# Patient Record
Sex: FEMALE | Race: ASIAN | NOT HISPANIC OR LATINO
[De-identification: names, ages, dates, MRNs, and addresses within clinical notes are randomized per-mention and may not be internally consistent; named-entity substitution may affect disease eponyms.]

---

## 2017-06-19 ENCOUNTER — APPOINTMENT (OUTPATIENT)
Dept: CARDIOLOGY | Facility: CLINIC | Age: 67
End: 2017-06-19

## 2017-06-19 ENCOUNTER — NON-APPOINTMENT (OUTPATIENT)
Age: 67
End: 2017-06-19

## 2017-06-19 VITALS
HEART RATE: 53 BPM | HEIGHT: 60 IN | WEIGHT: 116 LBS | SYSTOLIC BLOOD PRESSURE: 131 MMHG | BODY MASS INDEX: 22.78 KG/M2 | DIASTOLIC BLOOD PRESSURE: 76 MMHG | OXYGEN SATURATION: 95 %

## 2017-06-19 DIAGNOSIS — R01.1 CARDIAC MURMUR, UNSPECIFIED: ICD-10-CM

## 2017-07-29 ENCOUNTER — APPOINTMENT (OUTPATIENT)
Dept: CARDIOLOGY | Facility: CLINIC | Age: 67
End: 2017-07-29
Payer: MEDICARE

## 2017-07-29 PROCEDURE — 93880 EXTRACRANIAL BILAT STUDY: CPT

## 2017-08-01 ENCOUNTER — APPOINTMENT (OUTPATIENT)
Dept: OTOLARYNGOLOGY | Facility: CLINIC | Age: 67
End: 2017-08-01
Payer: MEDICARE

## 2017-08-01 VITALS
DIASTOLIC BLOOD PRESSURE: 70 MMHG | WEIGHT: 116 LBS | SYSTOLIC BLOOD PRESSURE: 125 MMHG | BODY MASS INDEX: 22.78 KG/M2 | HEART RATE: 69 BPM | HEIGHT: 60 IN

## 2017-08-01 PROCEDURE — 92504 EAR MICROSCOPY EXAMINATION: CPT

## 2017-08-01 PROCEDURE — 92567 TYMPANOMETRY: CPT

## 2017-08-01 PROCEDURE — 92557 COMPREHENSIVE HEARING TEST: CPT

## 2017-08-01 PROCEDURE — 99214 OFFICE O/P EST MOD 30 MIN: CPT

## 2017-08-05 ENCOUNTER — APPOINTMENT (OUTPATIENT)
Dept: CARDIOLOGY | Facility: CLINIC | Age: 67
End: 2017-08-05
Payer: MEDICARE

## 2017-08-05 PROCEDURE — 93306 TTE W/DOPPLER COMPLETE: CPT

## 2017-09-01 ENCOUNTER — TRANSCRIPTION ENCOUNTER (OUTPATIENT)
Age: 67
End: 2017-09-01

## 2017-09-28 ENCOUNTER — APPOINTMENT (OUTPATIENT)
Dept: OPHTHALMOLOGY | Facility: CLINIC | Age: 67
End: 2017-09-28
Payer: MEDICARE

## 2017-09-28 DIAGNOSIS — H35.412 LATTICE DEGENERATION OF RETINA, LEFT EYE: ICD-10-CM

## 2017-09-28 PROCEDURE — 92225: CPT | Mod: LT

## 2017-09-28 PROCEDURE — 92014 COMPRE OPH EXAM EST PT 1/>: CPT

## 2017-11-14 ENCOUNTER — APPOINTMENT (OUTPATIENT)
Dept: CARDIOLOGY | Facility: CLINIC | Age: 67
End: 2017-11-14
Payer: MEDICARE

## 2017-11-14 ENCOUNTER — NON-APPOINTMENT (OUTPATIENT)
Age: 67
End: 2017-11-14

## 2017-11-14 VITALS
DIASTOLIC BLOOD PRESSURE: 83 MMHG | HEIGHT: 60 IN | SYSTOLIC BLOOD PRESSURE: 137 MMHG | BODY MASS INDEX: 23.56 KG/M2 | HEART RATE: 57 BPM | OXYGEN SATURATION: 97 % | WEIGHT: 120 LBS

## 2017-11-14 PROCEDURE — 99214 OFFICE O/P EST MOD 30 MIN: CPT

## 2017-11-14 PROCEDURE — 93000 ELECTROCARDIOGRAM COMPLETE: CPT

## 2017-11-30 ENCOUNTER — MEDICATION RENEWAL (OUTPATIENT)
Age: 67
End: 2017-11-30

## 2017-11-30 ENCOUNTER — FORM ENCOUNTER (OUTPATIENT)
Age: 67
End: 2017-11-30

## 2017-12-01 ENCOUNTER — APPOINTMENT (OUTPATIENT)
Dept: CARDIOLOGY | Facility: CLINIC | Age: 67
End: 2017-12-01

## 2017-12-01 ENCOUNTER — OUTPATIENT (OUTPATIENT)
Dept: OUTPATIENT SERVICES | Facility: HOSPITAL | Age: 67
LOS: 1 days | End: 2017-12-01
Payer: SELF-PAY

## 2017-12-01 DIAGNOSIS — R07.9 CHEST PAIN, UNSPECIFIED: ICD-10-CM

## 2017-12-01 DIAGNOSIS — E78.5 HYPERLIPIDEMIA, UNSPECIFIED: ICD-10-CM

## 2017-12-01 PROCEDURE — 75571 CT HRT W/O DYE W/CA TEST: CPT

## 2017-12-01 PROCEDURE — 75571 CT HRT W/O DYE W/CA TEST: CPT | Mod: 26

## 2017-12-11 ENCOUNTER — APPOINTMENT (OUTPATIENT)
Dept: CARDIOLOGY | Facility: CLINIC | Age: 67
End: 2017-12-11
Payer: MEDICARE

## 2017-12-11 VITALS
HEIGHT: 60 IN | DIASTOLIC BLOOD PRESSURE: 82 MMHG | HEART RATE: 57 BPM | BODY MASS INDEX: 22.78 KG/M2 | SYSTOLIC BLOOD PRESSURE: 136 MMHG | OXYGEN SATURATION: 96 % | WEIGHT: 116 LBS

## 2017-12-11 PROCEDURE — 99214 OFFICE O/P EST MOD 30 MIN: CPT

## 2018-09-28 ENCOUNTER — APPOINTMENT (OUTPATIENT)
Dept: OPHTHALMOLOGY | Facility: CLINIC | Age: 68
End: 2018-09-28
Payer: MEDICARE

## 2018-09-28 PROCEDURE — 92134 CPTRZ OPH DX IMG PST SGM RTA: CPT

## 2018-09-28 PROCEDURE — 92004 COMPRE OPH EXAM NEW PT 1/>: CPT

## 2018-10-04 ENCOUNTER — APPOINTMENT (OUTPATIENT)
Dept: SPEECH THERAPY | Facility: CLINIC | Age: 68
End: 2018-10-04

## 2018-10-04 ENCOUNTER — OUTPATIENT (OUTPATIENT)
Dept: OUTPATIENT SERVICES | Facility: HOSPITAL | Age: 68
LOS: 1 days | Discharge: HOME | End: 2018-10-04

## 2018-10-05 DIAGNOSIS — H90.3 SENSORINEURAL HEARING LOSS, BILATERAL: ICD-10-CM

## 2018-10-15 ENCOUNTER — APPOINTMENT (OUTPATIENT)
Dept: OPHTHALMOLOGY | Facility: CLINIC | Age: 68
End: 2018-10-15
Payer: MEDICARE

## 2018-10-15 DIAGNOSIS — H26.492 OTHER SECONDARY CATARACT, LEFT EYE: ICD-10-CM

## 2018-10-15 PROCEDURE — 92014 COMPRE OPH EXAM EST PT 1/>: CPT

## 2018-10-26 ENCOUNTER — RESULT REVIEW (OUTPATIENT)
Age: 68
End: 2018-10-26

## 2018-10-26 ENCOUNTER — APPOINTMENT (OUTPATIENT)
Dept: OBGYN | Facility: CLINIC | Age: 68
End: 2018-10-26
Payer: MEDICARE

## 2018-10-26 PROCEDURE — G0101: CPT

## 2018-11-16 ENCOUNTER — TRANSCRIPTION ENCOUNTER (OUTPATIENT)
Age: 68
End: 2018-11-16

## 2018-11-28 ENCOUNTER — APPOINTMENT (OUTPATIENT)
Dept: OPHTHALMOLOGY | Facility: CLINIC | Age: 68
End: 2018-11-28

## 2018-11-30 ENCOUNTER — APPOINTMENT (OUTPATIENT)
Dept: CT IMAGING | Facility: IMAGING CENTER | Age: 68
End: 2018-11-30
Payer: MEDICARE

## 2018-11-30 ENCOUNTER — OUTPATIENT (OUTPATIENT)
Dept: OUTPATIENT SERVICES | Facility: HOSPITAL | Age: 68
LOS: 1 days | End: 2018-11-30
Payer: MEDICARE

## 2018-11-30 DIAGNOSIS — Z00.8 ENCOUNTER FOR OTHER GENERAL EXAMINATION: ICD-10-CM

## 2018-11-30 PROCEDURE — 71250 CT THORAX DX C-: CPT

## 2018-11-30 PROCEDURE — 71250 CT THORAX DX C-: CPT | Mod: 26

## 2018-12-04 ENCOUNTER — APPOINTMENT (OUTPATIENT)
Dept: CARDIOLOGY | Facility: CLINIC | Age: 68
End: 2018-12-04
Payer: MEDICARE

## 2018-12-04 ENCOUNTER — NON-APPOINTMENT (OUTPATIENT)
Age: 68
End: 2018-12-04

## 2018-12-04 VITALS
OXYGEN SATURATION: 98 % | SYSTOLIC BLOOD PRESSURE: 117 MMHG | BODY MASS INDEX: 22.97 KG/M2 | WEIGHT: 117 LBS | DIASTOLIC BLOOD PRESSURE: 72 MMHG | HEART RATE: 59 BPM | HEIGHT: 60 IN

## 2018-12-04 PROCEDURE — 99214 OFFICE O/P EST MOD 30 MIN: CPT

## 2018-12-04 PROCEDURE — 93000 ELECTROCARDIOGRAM COMPLETE: CPT

## 2019-01-05 ENCOUNTER — APPOINTMENT (OUTPATIENT)
Dept: OTOLARYNGOLOGY | Facility: CLINIC | Age: 69
End: 2019-01-05

## 2019-03-27 ENCOUNTER — APPOINTMENT (OUTPATIENT)
Dept: GASTROENTEROLOGY | Facility: CLINIC | Age: 69
End: 2019-03-27

## 2019-04-29 ENCOUNTER — APPOINTMENT (OUTPATIENT)
Dept: OPHTHALMOLOGY | Facility: CLINIC | Age: 69
End: 2019-04-29
Payer: COMMERCIAL

## 2019-04-29 PROCEDURE — 92012 INTRM OPH EXAM EST PATIENT: CPT

## 2019-05-06 ENCOUNTER — TRANSCRIPTION ENCOUNTER (OUTPATIENT)
Age: 69
End: 2019-05-06

## 2019-05-09 ENCOUNTER — APPOINTMENT (OUTPATIENT)
Dept: OPHTHALMOLOGY | Facility: CLINIC | Age: 69
End: 2019-05-09
Payer: COMMERCIAL

## 2019-05-09 DIAGNOSIS — H10.012 ACUTE FOLLICULAR CONJUNCTIVITIS, LEFT EYE: ICD-10-CM

## 2019-05-09 PROCEDURE — 92012 INTRM OPH EXAM EST PATIENT: CPT

## 2019-08-16 ENCOUNTER — APPOINTMENT (OUTPATIENT)
Dept: INTERNAL MEDICINE | Facility: CLINIC | Age: 69
End: 2019-08-16
Payer: MEDICARE

## 2019-08-16 VITALS
SYSTOLIC BLOOD PRESSURE: 130 MMHG | HEART RATE: 67 BPM | WEIGHT: 114 LBS | HEIGHT: 59 IN | OXYGEN SATURATION: 98 % | BODY MASS INDEX: 22.98 KG/M2 | DIASTOLIC BLOOD PRESSURE: 70 MMHG

## 2019-08-16 DIAGNOSIS — K76.89 OTHER SPECIFIED DISEASES OF LIVER: ICD-10-CM

## 2019-08-16 PROCEDURE — G0438: CPT

## 2019-08-29 ENCOUNTER — APPOINTMENT (OUTPATIENT)
Dept: CARDIOLOGY | Facility: CLINIC | Age: 69
End: 2019-08-29
Payer: MEDICARE

## 2019-08-29 ENCOUNTER — NON-APPOINTMENT (OUTPATIENT)
Age: 69
End: 2019-08-29

## 2019-08-29 VITALS
HEART RATE: 65 BPM | DIASTOLIC BLOOD PRESSURE: 71 MMHG | OXYGEN SATURATION: 99 % | WEIGHT: 113 LBS | BODY MASS INDEX: 22.78 KG/M2 | HEIGHT: 59 IN | SYSTOLIC BLOOD PRESSURE: 127 MMHG

## 2019-08-29 PROBLEM — K76.89 HEPATIC CYST: Status: ACTIVE | Noted: 2019-08-16

## 2019-08-29 PROCEDURE — 99214 OFFICE O/P EST MOD 30 MIN: CPT

## 2019-08-29 PROCEDURE — 93000 ELECTROCARDIOGRAM COMPLETE: CPT

## 2019-08-29 NOTE — PAST MEDICAL HISTORY
[Postmenopausal] : postmenopausal [Menarche Age ____] : age at menarche was [unfilled] [de-identified] : s/p [facility] 2003

## 2019-08-29 NOTE — HISTORY OF PRESENT ILLNESS
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

## 2019-08-29 NOTE — REVIEW OF SYSTEMS
[Shortness Of Breath] : no shortness of breath [Dyspnea on exertion] : not dyspnea during exertion [Chest  Pressure] : no chest pressure [Chest Pain] : no chest pain [Lower Ext Edema] : no extremity edema [Leg Claudication] : no intermittent leg claudication [Palpitations] : no palpitations [Cough] : no cough [Wheezing] : no wheezing [Abdominal Pain] : no abdominal pain [Nausea] : no nausea [Heartburn] : no heartburn [Change in Appetite] : no change in appetite [Dysphagia] : no dysphagia [Dysuria] : no dysuria [Negative] : Endocrine

## 2019-08-29 NOTE — HISTORY OF PRESENT ILLNESS
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 She is feeling well although she has not been exercising regularly due to her extensive work finishing up her medical text book. Most recently, she was evaluated for upper respiratory infection and CAT scan of the chest revealed left lower lobe scarring, bronchiectasis, small pulmonary nodules, and scoliosis for which she is being followed. She reports no chest pain, dyspnea, palpitations, lightheadedness, or syncope.\par \par Past medical history is remarkable for mild CAD with calcium score of 5, mild carotid atherosclerosis, hypothyroidism, osteopenia, scoliosis, total abdominal hysterectomy/bilateral salpingo-oophorectomy at age 54 for dysfunctional uterine bleeding, and mild hyperlipidemia. She has no history of diabetes, rheumatic fever, peptic ulcer disease, bleeding disorders, or pulmonary disease.\par

## 2019-08-29 NOTE — PHYSICAL EXAM
[No Acute Distress] : no acute distress [Well Developed] : well developed [Well Nourished] : well nourished [Well-Appearing] : well-appearing [Normal Sclera/Conjunctiva] : normal sclera/conjunctiva [PERRL] : pupils equal round and reactive to light [EOMI] : extraocular movements intact [Normal Outer Ear/Nose] : the outer ears and nose were normal in appearance [Normal Oropharynx] : the oropharynx was normal [No JVD] : no jugular venous distention [Supple] : supple [No Lymphadenopathy] : no lymphadenopathy [No Respiratory Distress] : no respiratory distress  [Thyroid Normal, No Nodules] : the thyroid was normal and there were no nodules present [No Accessory Muscle Use] : no accessory muscle use [Normal Rate] : normal rate  [Regular Rhythm] : with a regular rhythm [Normal S1, S2] : normal S1 and S2 [No Murmur] : no murmur heard [No Carotid Bruits] : no carotid bruits [No Abdominal Bruit] : a ~M bruit was not heard ~T in the abdomen [No Varicosities] : no varicosities [Pedal Pulses Present] : the pedal pulses are present [No Edema] : there was no peripheral edema [No Palpable Aorta] : no palpable aorta [No Extremity Clubbing/Cyanosis] : no extremity clubbing/cyanosis [Soft] : abdomen soft [Non Tender] : non-tender [Non-distended] : non-distended [No Masses] : no abdominal mass palpated [No HSM] : no HSM [Normal Bowel Sounds] : normal bowel sounds [Normal Posterior Cervical Nodes] : no posterior cervical lymphadenopathy [Normal Anterior Cervical Nodes] : no anterior cervical lymphadenopathy [No CVA Tenderness] : no CVA  tenderness [No Spinal Tenderness] : no spinal tenderness [No Joint Swelling] : no joint swelling [Scoliosis] : scoliosis [Grossly Normal Strength/Tone] : grossly normal strength/tone [No Rash] : no rash [Coordination Grossly Intact] : coordination grossly intact [Normal Gait] : normal gait [No Focal Deficits] : no focal deficits [Deep Tendon Reflexes (DTR)] : deep tendon reflexes were 2+ and symmetric [Normal Affect] : the affect was normal [Normal Insight/Judgement] : insight and judgment were intact [de-identified] : basilar crackles at left base only with spinal curvature noted

## 2019-08-29 NOTE — HEALTH RISK ASSESSMENT
[Yes] : Yes [Monthly or less (1 pt)] : Monthly or less (1 point) [No falls in past year] : Patient reported no falls in the past year [Patient reported bone density results were abnormal] : Patient reported bone density results were abnormal [0] : 2) Feeling down, depressed, or hopeless: Not at all (0) [Patient reported colonoscopy was normal] : Patient reported colonoscopy was normal [] : No [de-identified] : occasioanl [[id number]] : 0 [BoneDensityDate] : 03/18 [BoneDensityComments] : osteopenia [ColonoscopyDate] : 12/18 [ColonoscopyComments] : as per pt [FreeTextEntry4] : done

## 2019-08-29 NOTE — DISCUSSION/SUMMARY
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 We had an extensive discussion concerning her mild carotid atherosclerosis noted in the past as well as her calcium score of 5. Given subclinical atherosclerosis, we will increase pravastatin to 40mg given a nice combination of LDL reduction and high HDL. She will continue aspirin 81 mg every other daily and we had an extensive discussion concerning cardiovascular risk factor reduction. We will repeat her carotid Doppler evaluation this  year. Counseling on cardiovascular risk represented greater than 50% of her visit which was 30 minutes in duration

## 2019-09-05 ENCOUNTER — APPOINTMENT (OUTPATIENT)
Dept: CARDIOLOGY | Facility: CLINIC | Age: 69
End: 2019-09-05
Payer: MEDICARE

## 2019-09-05 PROCEDURE — 93880 EXTRACRANIAL BILAT STUDY: CPT

## 2019-09-24 ENCOUNTER — APPOINTMENT (OUTPATIENT)
Dept: OTOLARYNGOLOGY | Facility: CLINIC | Age: 69
End: 2019-09-24
Payer: MEDICARE

## 2019-09-24 VITALS
BODY MASS INDEX: 22.78 KG/M2 | HEART RATE: 62 BPM | DIASTOLIC BLOOD PRESSURE: 66 MMHG | HEIGHT: 59 IN | WEIGHT: 113 LBS | SYSTOLIC BLOOD PRESSURE: 107 MMHG

## 2019-09-24 PROCEDURE — 92557 COMPREHENSIVE HEARING TEST: CPT

## 2019-09-24 PROCEDURE — 92567 TYMPANOMETRY: CPT

## 2019-09-24 PROCEDURE — 99214 OFFICE O/P EST MOD 30 MIN: CPT | Mod: 25

## 2019-09-24 RX ORDER — ASPIRIN 81 MG/1
81 TABLET ORAL
Refills: 0 | Status: DISCONTINUED | COMMUNITY
Start: 2017-12-11 | End: 2019-09-24

## 2019-09-25 NOTE — HISTORY OF PRESENT ILLNESS
[de-identified] : 69 year old female follow up for bilateral mild SNHL, Left worse than Right, not wearing hearing aids.  Patient states last hearing test 1 year ago.  Denies otalgia, otorrhea, dizziness, vertigo, tinnitus and ear infections in the pat year.

## 2019-09-25 NOTE — REASON FOR VISIT
[Subsequent Evaluation] : a subsequent evaluation for [Other: _____] : [unfilled] [FreeTextEntry2] : follow up for bilateral mild SNHL, Left worse than Right, not wearing hearing aids.

## 2019-10-31 ENCOUNTER — RESULT REVIEW (OUTPATIENT)
Age: 69
End: 2019-10-31

## 2019-11-01 ENCOUNTER — APPOINTMENT (OUTPATIENT)
Dept: OBGYN | Facility: CLINIC | Age: 69
End: 2019-11-01
Payer: MEDICARE

## 2019-11-01 PROCEDURE — G0101: CPT

## 2019-12-03 ENCOUNTER — APPOINTMENT (OUTPATIENT)
Dept: INTERNAL MEDICINE | Facility: CLINIC | Age: 69
End: 2019-12-03
Payer: MEDICARE

## 2019-12-03 VITALS
WEIGHT: 118 LBS | BODY MASS INDEX: 23.79 KG/M2 | HEART RATE: 62 BPM | OXYGEN SATURATION: 98 % | HEIGHT: 59 IN | SYSTOLIC BLOOD PRESSURE: 140 MMHG | DIASTOLIC BLOOD PRESSURE: 90 MMHG

## 2019-12-03 VITALS — DIASTOLIC BLOOD PRESSURE: 96 MMHG | SYSTOLIC BLOOD PRESSURE: 150 MMHG

## 2019-12-03 PROCEDURE — 99214 OFFICE O/P EST MOD 30 MIN: CPT

## 2019-12-03 PROCEDURE — G0442 ANNUAL ALCOHOL SCREEN 15 MIN: CPT | Mod: 59

## 2019-12-03 NOTE — PHYSICAL EXAM
[No Acute Distress] : no acute distress [No Edema] : there was no peripheral edema [Well Nourished] : well nourished [No Focal Deficits] : no focal deficits [Normal Gait] : normal gait [Normal] : affect was normal and insight and judgment were intact

## 2019-12-03 NOTE — HISTORY OF PRESENT ILLNESS
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 Today BP in office multipl time by me-150/90-94.\par Returned from 2 week cruise to [place], ate well, not limiting salt\par \par Had 2 pneumovax and tdap and colonosocpy and will forward dates

## 2019-12-03 NOTE — HEALTH RISK ASSESSMENT
[] : No [No] : No [No falls in past year] : Patient reported no falls in the past year [Audit-CScore] : 0 [0] : 2) Feeling down, depressed, or hopeless: Not at all (0) [[id number]] : 0

## 2019-12-11 ENCOUNTER — TRANSCRIPTION ENCOUNTER (OUTPATIENT)
Age: 69
End: 2019-12-11

## 2019-12-17 ENCOUNTER — APPOINTMENT (OUTPATIENT)
Dept: CARDIOLOGY | Facility: CLINIC | Age: 69
End: 2019-12-17
Payer: MEDICARE

## 2019-12-17 VITALS
OXYGEN SATURATION: 100 % | DIASTOLIC BLOOD PRESSURE: 82 MMHG | SYSTOLIC BLOOD PRESSURE: 135 MMHG | HEART RATE: 64 BPM | HEIGHT: 59 IN | WEIGHT: 119 LBS | BODY MASS INDEX: 23.99 KG/M2

## 2019-12-17 PROCEDURE — 99214 OFFICE O/P EST MOD 30 MIN: CPT

## 2020-01-01 ENCOUNTER — TRANSCRIPTION ENCOUNTER (OUTPATIENT)
Age: 70
End: 2020-01-01

## 2020-01-07 ENCOUNTER — FORM ENCOUNTER (OUTPATIENT)
Age: 70
End: 2020-01-07

## 2020-01-08 ENCOUNTER — APPOINTMENT (OUTPATIENT)
Dept: CT IMAGING | Facility: IMAGING CENTER | Age: 70
End: 2020-01-08
Payer: MEDICARE

## 2020-01-08 ENCOUNTER — OUTPATIENT (OUTPATIENT)
Dept: OUTPATIENT SERVICES | Facility: HOSPITAL | Age: 70
LOS: 1 days | End: 2020-01-08
Payer: MEDICARE

## 2020-01-08 DIAGNOSIS — Z00.8 ENCOUNTER FOR OTHER GENERAL EXAMINATION: ICD-10-CM

## 2020-01-08 PROCEDURE — 71250 CT THORAX DX C-: CPT

## 2020-01-08 PROCEDURE — 71250 CT THORAX DX C-: CPT | Mod: 26

## 2020-03-03 ENCOUNTER — APPOINTMENT (OUTPATIENT)
Dept: INTERNAL MEDICINE | Facility: CLINIC | Age: 70
End: 2020-03-03

## 2020-08-14 ENCOUNTER — NON-APPOINTMENT (OUTPATIENT)
Age: 70
End: 2020-08-14

## 2020-08-14 ENCOUNTER — APPOINTMENT (OUTPATIENT)
Dept: CARDIOLOGY | Facility: CLINIC | Age: 70
End: 2020-08-14
Payer: MEDICARE

## 2020-08-14 VITALS
HEART RATE: 49 BPM | DIASTOLIC BLOOD PRESSURE: 81 MMHG | HEIGHT: 59 IN | WEIGHT: 111 LBS | SYSTOLIC BLOOD PRESSURE: 136 MMHG | BODY MASS INDEX: 22.38 KG/M2 | OXYGEN SATURATION: 98 %

## 2020-08-14 DIAGNOSIS — I65.29 OCCLUSION AND STENOSIS OF UNSPECIFIED CAROTID ARTERY: ICD-10-CM

## 2020-08-14 PROCEDURE — 93000 ELECTROCARDIOGRAM COMPLETE: CPT

## 2020-08-14 PROCEDURE — 99214 OFFICE O/P EST MOD 30 MIN: CPT

## 2020-08-15 ENCOUNTER — RESULT CHARGE (OUTPATIENT)
Age: 70
End: 2020-08-15

## 2020-08-16 PROBLEM — I65.29 CAROTID ATHEROSCLEROSIS: Status: ACTIVE | Noted: 2017-06-19

## 2020-08-18 ENCOUNTER — APPOINTMENT (OUTPATIENT)
Dept: INTERNAL MEDICINE | Facility: CLINIC | Age: 70
End: 2020-08-18

## 2020-08-24 ENCOUNTER — APPOINTMENT (OUTPATIENT)
Dept: INTERNAL MEDICINE | Facility: CLINIC | Age: 70
End: 2020-08-24

## 2020-10-29 ENCOUNTER — APPOINTMENT (OUTPATIENT)
Dept: INTERNAL MEDICINE | Facility: CLINIC | Age: 70
End: 2020-10-29

## 2020-11-06 ENCOUNTER — APPOINTMENT (OUTPATIENT)
Dept: INTERNAL MEDICINE | Facility: CLINIC | Age: 70
End: 2020-11-06
Payer: MEDICARE

## 2020-11-06 PROCEDURE — 99442: CPT | Mod: 95

## 2020-11-06 NOTE — HISTORY OF PRESENT ILLNESS
[Home] : at home, [unfilled] , at the time of the visit. [Other Location: e.g. Home (Enter Location, City,State)___] : at [unfilled] [Verbal consent obtained from patient] : the patient, [unfilled] [de-identified] : Wants to review all her labs and imaging reports that she prepared as she did not want to come in of her wellness visit . Her  in 81 and concerned she needs to stay safe and not risk getting COVID infection.\par -dexa 8/12/2020 osteopenia\par -labs [phone]\par -mammogram/US-10/23/2020\par -BP [id number]'s\par -Immunization records

## 2021-05-03 ENCOUNTER — APPOINTMENT (OUTPATIENT)
Dept: INTERNAL MEDICINE | Facility: CLINIC | Age: 71
End: 2021-05-03
Payer: MEDICARE

## 2021-05-03 VITALS
WEIGHT: 110 LBS | BODY MASS INDEX: 22.18 KG/M2 | DIASTOLIC BLOOD PRESSURE: 70 MMHG | HEIGHT: 59 IN | HEART RATE: 68 BPM | OXYGEN SATURATION: 97 % | SYSTOLIC BLOOD PRESSURE: 120 MMHG

## 2021-05-03 DIAGNOSIS — Z78.9 OTHER SPECIFIED HEALTH STATUS: ICD-10-CM

## 2021-05-03 PROCEDURE — G0439: CPT

## 2021-05-03 PROCEDURE — G0442 ANNUAL ALCOHOL SCREEN 15 MIN: CPT | Mod: 59

## 2021-05-03 PROCEDURE — G0444 DEPRESSION SCREEN ANNUAL: CPT | Mod: 59

## 2021-05-03 NOTE — HISTORY OF PRESENT ILLNESS
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..\par Has 2 children in 40's , one with thyroid condition.\par PMH:\par -Scoliosis/Bronchiectasisi with chronic lung changes and rales at left base-followed by pulm\par -Hypothyroid-on levothyroxine -normal TSH April 2019-[facility] (endo) but will stop going and get labs with me\par -HLD-on diet/statin-cardio f/u Dr Torres-calcium score 5, mild carotis plaque, on aspirin\par -osteopenia-on calcium/Vit D -DEXA 3/23/2018, 8/12/2020\par -Hx hepatic cyst-last f/u with [facility] liver 8/2019-wants rx to repeat study\par -Last colonoscopy Dec 2018-reportedly normal\par -Had Pfizer covid vx\par -Brought update of her vaccines and datesreceived, needs Tdap but will get at pharmacy where it is covered

## 2021-05-03 NOTE — HEALTH RISK ASSESSMENT
[] : No [Yes] : Yes [Monthly or less (1 pt)] : Monthly or less (1 point) [No] : In the past 12 months have you used drugs other than those required for medical reasons? No [No falls in past year] : Patient reported no falls in the past year [0] : 2) Feeling down, depressed, or hopeless: Not at all (0) [de-identified] : occasioanl [Audit-CScore] : 1 [[id number]] : 0 [Patient reported bone density results were abnormal] : Patient reported bone density results were abnormal [Patient reported colonoscopy was normal] : Patient reported colonoscopy was normal [BoneDensityDate] : 03/18 [BoneDensityComments] : osteopenia [ColonoscopyDate] : 12/18 [ColonoscopyComments] : as per pt [FreeTextEntry4] : done

## 2021-05-03 NOTE — PHYSICAL EXAM
[Well Nourished] : well nourished [Well Developed] : well developed [Well-Appearing] : well-appearing [Normal Sclera/Conjunctiva] : normal sclera/conjunctiva [PERRL] : pupils equal round and reactive to light [EOMI] : extraocular movements intact [Normal Outer Ear/Nose] : the outer ears and nose were normal in appearance [Normal Oropharynx] : the oropharynx was normal [No JVD] : no jugular venous distention [No Lymphadenopathy] : no lymphadenopathy [Supple] : supple [Thyroid Normal, No Nodules] : the thyroid was normal and there were no nodules present [No Respiratory Distress] : no respiratory distress  [No Accessory Muscle Use] : no accessory muscle use [Normal Rate] : normal rate  [Regular Rhythm] : with a regular rhythm [Normal S1, S2] : normal S1 and S2 [No Murmur] : no murmur heard [No Carotid Bruits] : no carotid bruits [No Abdominal Bruit] : a ~M bruit was not heard ~T in the abdomen [No Varicosities] : no varicosities [Pedal Pulses Present] : the pedal pulses are present [No Edema] : there was no peripheral edema [No Palpable Aorta] : no palpable aorta [No Extremity Clubbing/Cyanosis] : no extremity clubbing/cyanosis [Normal Appearance] : normal in appearance [No Nipple Discharge] : no nipple discharge [No Axillary Lymphadenopathy] : no axillary lymphadenopathy [Soft] : abdomen soft [Non Tender] : non-tender [Non-distended] : non-distended [No Masses] : no abdominal mass palpated [No HSM] : no HSM [Normal Bowel Sounds] : normal bowel sounds [Normal Posterior Cervical Nodes] : no posterior cervical lymphadenopathy [Normal Anterior Cervical Nodes] : no anterior cervical lymphadenopathy [No CVA Tenderness] : no CVA  tenderness [No Spinal Tenderness] : no spinal tenderness [Scoliosis] : scoliosis [No Joint Swelling] : no joint swelling [Grossly Normal Strength/Tone] : grossly normal strength/tone [No Rash] : no rash [Coordination Grossly Intact] : coordination grossly intact [No Focal Deficits] : no focal deficits [Normal Gait] : normal gait [Deep Tendon Reflexes (DTR)] : deep tendon reflexes were 2+ and symmetric [Normal Affect] : the affect was normal [Normal Insight/Judgement] : insight and judgment were intact [de-identified] : basilar crackles at left base only with spinal curvature noted

## 2021-05-03 NOTE — PAST MEDICAL HISTORY
[Postmenopausal] : postmenopausal [Menarche Age ____] : age at menarche was [unfilled] [de-identified] : s/p [facility] 2003

## 2021-05-05 LAB
25(OH)D3 SERPL-MCNC: 49.4 NG/ML
ALBUMIN SERPL ELPH-MCNC: 4.1 G/DL
ALP BLD-CCNC: 57 U/L
ALT SERPL-CCNC: 26 U/L
ANION GAP SERPL CALC-SCNC: 9 MMOL/L
APPEARANCE: CLEAR
AST SERPL-CCNC: 27 U/L
BACTERIA: NEGATIVE
BASOPHILS # BLD AUTO: 0.04 K/UL
BASOPHILS NFR BLD AUTO: 1.1 %
BILIRUB SERPL-MCNC: 0.3 MG/DL
BILIRUBIN URINE: NEGATIVE
BLOOD URINE: NEGATIVE
BUN SERPL-MCNC: 14 MG/DL
CALCIUM SERPL-MCNC: 9.4 MG/DL
CHLORIDE SERPL-SCNC: 106 MMOL/L
CHOLEST SERPL-MCNC: 176 MG/DL
CO2 SERPL-SCNC: 27 MMOL/L
COLOR: NORMAL
COVID-19 SPIKE DOMAIN ANTIBODY INTERPRETATION: POSITIVE
CREAT SERPL-MCNC: 0.72 MG/DL
EOSINOPHIL # BLD AUTO: 0.06 K/UL
EOSINOPHIL NFR BLD AUTO: 1.6 %
ESTIMATED AVERAGE GLUCOSE: 120 MG/DL
GLUCOSE QUALITATIVE U: NEGATIVE
GLUCOSE SERPL-MCNC: 94 MG/DL
HBA1C MFR BLD HPLC: 5.8 %
HCT VFR BLD CALC: 40.3 %
HDLC SERPL-MCNC: 80 MG/DL
HGB BLD-MCNC: 12.5 G/DL
HYALINE CASTS: 0 /LPF
IMM GRANULOCYTES NFR BLD AUTO: 0 %
KETONES URINE: NEGATIVE
LDLC SERPL CALC-MCNC: 84 MG/DL
LEUKOCYTE ESTERASE URINE: NEGATIVE
LYMPHOCYTES # BLD AUTO: 1.83 K/UL
LYMPHOCYTES NFR BLD AUTO: 48.7 %
MAN DIFF?: NORMAL
MCHC RBC-ENTMCNC: 26.2 PG
MCHC RBC-ENTMCNC: 31 GM/DL
MCV RBC AUTO: 84.5 FL
MICROSCOPIC-UA: NORMAL
MONOCYTES # BLD AUTO: 0.37 K/UL
MONOCYTES NFR BLD AUTO: 9.8 %
NEUTROPHILS # BLD AUTO: 1.46 K/UL
NEUTROPHILS NFR BLD AUTO: 38.8 %
NITRITE URINE: NEGATIVE
NONHDLC SERPL-MCNC: 96 MG/DL
PH URINE: 6.5
PLATELET # BLD AUTO: 206 K/UL
POTASSIUM SERPL-SCNC: 4.3 MMOL/L
PROT SERPL-MCNC: 6.6 G/DL
PROTEIN URINE: NEGATIVE
RBC # BLD: 4.77 M/UL
RBC # FLD: 14.1 %
RED BLOOD CELLS URINE: 2 /HPF
SARS-COV-2 AB SERPL IA-ACNC: 178 U/ML
SODIUM SERPL-SCNC: 142 MMOL/L
SPECIFIC GRAVITY URINE: 1.02
SQUAMOUS EPITHELIAL CELLS: 0 /HPF
T4 FREE SERPL-MCNC: 1.6 NG/DL
TRIGL SERPL-MCNC: 62 MG/DL
TSH SERPL-ACNC: 1.42 UIU/ML
UROBILINOGEN URINE: NORMAL
VIT B12 SERPL-MCNC: 842 PG/ML
WBC # FLD AUTO: 3.76 K/UL
WHITE BLOOD CELLS URINE: 1 /HPF

## 2021-05-07 ENCOUNTER — TRANSCRIPTION ENCOUNTER (OUTPATIENT)
Age: 71
End: 2021-05-07

## 2021-05-19 ENCOUNTER — NON-APPOINTMENT (OUTPATIENT)
Age: 71
End: 2021-05-19

## 2021-09-16 ENCOUNTER — APPOINTMENT (OUTPATIENT)
Dept: OTOLARYNGOLOGY | Facility: CLINIC | Age: 71
End: 2021-09-16
Payer: MEDICARE

## 2021-09-16 VITALS
BODY MASS INDEX: 21.6 KG/M2 | SYSTOLIC BLOOD PRESSURE: 108 MMHG | HEART RATE: 67 BPM | DIASTOLIC BLOOD PRESSURE: 69 MMHG | WEIGHT: 110 LBS | HEIGHT: 60 IN

## 2021-09-16 DIAGNOSIS — H90.3 SENSORINEURAL HEARING LOSS, BILATERAL: ICD-10-CM

## 2021-09-16 DIAGNOSIS — H91.13 PRESBYCUSIS, BILATERAL: ICD-10-CM

## 2021-09-16 PROCEDURE — 92557 COMPREHENSIVE HEARING TEST: CPT

## 2021-09-16 PROCEDURE — 92504 EAR MICROSCOPY EXAMINATION: CPT

## 2021-09-16 PROCEDURE — 92567 TYMPANOMETRY: CPT

## 2021-09-16 PROCEDURE — 99213 OFFICE O/P EST LOW 20 MIN: CPT | Mod: 25

## 2021-09-17 PROBLEM — H91.13 PRESBYCUSIS OF BOTH EARS: Status: ACTIVE | Noted: 2019-09-25

## 2021-09-17 NOTE — REASON FOR VISIT
[Subsequent Evaluation] : a subsequent evaluation for [FreeTextEntry2] : follow up for bilateral mild SNHL, Left worse than Right, not wearing hearing aids.

## 2021-09-17 NOTE — HISTORY OF PRESENT ILLNESS
[de-identified] : 71 year female follow up for bilateral mild SNHL, Left worse than Right, not wearing hearing aids. \par Hearing has gotten worse since last visit. L ear has more sensitivity to sound than right. Patient states she is ready for hearing aids.\par Patient denies otalgia, otorrhea, ear infections, tinnitus, dizziness, vertigo, headaches related to hearing. \par

## 2021-09-17 NOTE — DATA REVIEWED
[de-identified] : A pure tone audiogram with speech discrimination and tympanometry was ordered and performed due to patient's known lucy hearing loss and no recent audiogram performed to measure any changes  at our facility or elsewhere\par I have independently reviewed the patient's audiogram from today and my findings include lucy SNHL, decreased SDS (in 80s)\par \par

## 2021-10-04 ENCOUNTER — APPOINTMENT (OUTPATIENT)
Dept: CARDIOLOGY | Facility: CLINIC | Age: 71
End: 2021-10-04
Payer: MEDICARE

## 2021-10-04 ENCOUNTER — NON-APPOINTMENT (OUTPATIENT)
Age: 71
End: 2021-10-04

## 2021-10-04 ENCOUNTER — RESULT CHARGE (OUTPATIENT)
Age: 71
End: 2021-10-04

## 2021-10-04 VITALS
SYSTOLIC BLOOD PRESSURE: 120 MMHG | DIASTOLIC BLOOD PRESSURE: 74 MMHG | BODY MASS INDEX: 19.63 KG/M2 | WEIGHT: 115 LBS | HEIGHT: 64 IN | HEART RATE: 84 BPM | OXYGEN SATURATION: 98 %

## 2021-10-04 PROCEDURE — 93000 ELECTROCARDIOGRAM COMPLETE: CPT

## 2021-10-04 PROCEDURE — 99214 OFFICE O/P EST MOD 30 MIN: CPT

## 2022-04-05 ENCOUNTER — APPOINTMENT (OUTPATIENT)
Dept: CARDIOLOGY | Facility: CLINIC | Age: 72
End: 2022-04-05

## 2022-05-06 ENCOUNTER — APPOINTMENT (OUTPATIENT)
Dept: INTERNAL MEDICINE | Facility: CLINIC | Age: 72
End: 2022-05-06
Payer: MEDICARE

## 2022-05-06 VITALS
HEIGHT: 64 IN | HEART RATE: 64 BPM | WEIGHT: 119 LBS | DIASTOLIC BLOOD PRESSURE: 80 MMHG | BODY MASS INDEX: 20.32 KG/M2 | SYSTOLIC BLOOD PRESSURE: 120 MMHG | OXYGEN SATURATION: 98 %

## 2022-05-06 DIAGNOSIS — N60.19 DIFFUSE CYSTIC MASTOPATHY OF UNSPECIFIED BREAST: ICD-10-CM

## 2022-05-06 DIAGNOSIS — R92.2 INCONCLUSIVE MAMMOGRAM: ICD-10-CM

## 2022-05-06 DIAGNOSIS — K76.9 LIVER DISEASE, UNSPECIFIED: ICD-10-CM

## 2022-05-06 PROCEDURE — G0442 ANNUAL ALCOHOL SCREEN 15 MIN: CPT | Mod: 59

## 2022-05-06 PROCEDURE — G0444 DEPRESSION SCREEN ANNUAL: CPT | Mod: 59

## 2022-05-06 PROCEDURE — G0439: CPT

## 2022-05-09 ENCOUNTER — RX RENEWAL (OUTPATIENT)
Age: 72
End: 2022-05-09

## 2022-05-11 ENCOUNTER — APPOINTMENT (OUTPATIENT)
Dept: CARDIOLOGY | Facility: CLINIC | Age: 72
End: 2022-05-11
Payer: MEDICARE

## 2022-05-11 ENCOUNTER — NON-APPOINTMENT (OUTPATIENT)
Age: 72
End: 2022-05-11

## 2022-05-11 VITALS
WEIGHT: 120 LBS | SYSTOLIC BLOOD PRESSURE: 138 MMHG | HEIGHT: 59 IN | OXYGEN SATURATION: 97 % | DIASTOLIC BLOOD PRESSURE: 79 MMHG | BODY MASS INDEX: 24.19 KG/M2 | HEART RATE: 65 BPM

## 2022-05-11 DIAGNOSIS — I25.10 ATHEROSCLEROTIC HEART DISEASE OF NATIVE CORONARY ARTERY W/OUT ANGINA PECTORIS: ICD-10-CM

## 2022-05-11 PROCEDURE — 93000 ELECTROCARDIOGRAM COMPLETE: CPT

## 2022-05-11 PROCEDURE — 99214 OFFICE O/P EST MOD 30 MIN: CPT

## 2022-05-11 RX ORDER — PRAVASTATIN SODIUM 40 MG/1
40 TABLET ORAL
Qty: 90 | Refills: 3 | Status: DISCONTINUED | COMMUNITY
Start: 2017-12-11 | End: 2022-05-11

## 2022-05-12 ENCOUNTER — NON-APPOINTMENT (OUTPATIENT)
Age: 72
End: 2022-05-12

## 2022-05-12 PROBLEM — I25.10 CAD (CORONARY ARTERY DISEASE): Status: ACTIVE | Noted: 2017-12-11

## 2022-05-20 ENCOUNTER — APPOINTMENT (OUTPATIENT)
Dept: CARDIOLOGY | Facility: CLINIC | Age: 72
End: 2022-05-20
Payer: MEDICARE

## 2022-05-20 PROCEDURE — 93880 EXTRACRANIAL BILAT STUDY: CPT

## 2022-05-20 PROCEDURE — 93306 TTE W/DOPPLER COMPLETE: CPT

## 2022-05-25 ENCOUNTER — APPOINTMENT (OUTPATIENT)
Dept: OPHTHALMOLOGY | Facility: CLINIC | Age: 72
End: 2022-05-25
Payer: MEDICARE

## 2022-05-25 ENCOUNTER — NON-APPOINTMENT (OUTPATIENT)
Age: 72
End: 2022-05-25

## 2022-05-25 PROCEDURE — 92004 COMPRE OPH EXAM NEW PT 1/>: CPT

## 2022-05-31 LAB
25(OH)D3 SERPL-MCNC: 49 NG/ML
ALBUMIN SERPL ELPH-MCNC: 4.5 G/DL
ALP BLD-CCNC: 67 U/L
ALT SERPL-CCNC: 44 U/L
ANION GAP SERPL CALC-SCNC: 11 MMOL/L
APPEARANCE: CLEAR
AST SERPL-CCNC: 34 U/L
BACTERIA: NEGATIVE
BASOPHILS # BLD AUTO: 0.05 K/UL
BASOPHILS NFR BLD AUTO: 1.1 %
BILIRUB SERPL-MCNC: 0.5 MG/DL
BILIRUBIN URINE: NEGATIVE
BLOOD URINE: NEGATIVE
BUN SERPL-MCNC: 9 MG/DL
CALCIUM SERPL-MCNC: 10.2 MG/DL
CHLORIDE SERPL-SCNC: 103 MMOL/L
CHOLEST SERPL-MCNC: 212 MG/DL
CO2 SERPL-SCNC: 27 MMOL/L
COLOR: COLORLESS
CREAT SERPL-MCNC: 0.77 MG/DL
EGFR: 83 ML/MIN/1.73M2
EOSINOPHIL # BLD AUTO: 0.1 K/UL
EOSINOPHIL NFR BLD AUTO: 2.2 %
ESTIMATED AVERAGE GLUCOSE: 123 MG/DL
GLUCOSE QUALITATIVE U: NEGATIVE
GLUCOSE SERPL-MCNC: 89 MG/DL
HBA1C MFR BLD HPLC: 5.9 %
HCT VFR BLD CALC: 43.5 %
HDLC SERPL-MCNC: 92 MG/DL
HGB BLD-MCNC: 13.1 G/DL
HYALINE CASTS: 0 /LPF
IMM GRANULOCYTES NFR BLD AUTO: 0.2 %
KETONES URINE: NEGATIVE
LDLC SERPL CALC-MCNC: 108 MG/DL
LEUKOCYTE ESTERASE URINE: NEGATIVE
LYMPHOCYTES # BLD AUTO: 2.39 K/UL
LYMPHOCYTES NFR BLD AUTO: 51.8 %
MAN DIFF?: NORMAL
MCHC RBC-ENTMCNC: 25.4 PG
MCHC RBC-ENTMCNC: 30.1 GM/DL
MCV RBC AUTO: 84.3 FL
MICROSCOPIC-UA: NORMAL
MONOCYTES # BLD AUTO: 0.47 K/UL
MONOCYTES NFR BLD AUTO: 10.2 %
NEUTROPHILS # BLD AUTO: 1.59 K/UL
NEUTROPHILS NFR BLD AUTO: 34.5 %
NITRITE URINE: NEGATIVE
NONHDLC SERPL-MCNC: 120 MG/DL
PH URINE: 6.5
PLATELET # BLD AUTO: 243 K/UL
POTASSIUM SERPL-SCNC: 4.2 MMOL/L
PROT SERPL-MCNC: 6.9 G/DL
PROTEIN URINE: NEGATIVE
RBC # BLD: 5.16 M/UL
RBC # FLD: 15 %
RED BLOOD CELLS URINE: 0 /HPF
SODIUM SERPL-SCNC: 141 MMOL/L
SPECIFIC GRAVITY URINE: 1.01
SQUAMOUS EPITHELIAL CELLS: 0 /HPF
T4 FREE SERPL-MCNC: 1.5 NG/DL
TRIGL SERPL-MCNC: 57 MG/DL
TSH SERPL-ACNC: 3.25 UIU/ML
UROBILINOGEN URINE: NORMAL
WBC # FLD AUTO: 4.61 K/UL
WHITE BLOOD CELLS URINE: 0 /HPF

## 2022-06-28 NOTE — HEALTH RISK ASSESSMENT
[Yes] : Yes [Monthly or less (1 pt)] : Monthly or less (1 point) [No] : In the past 12 months have you used drugs other than those required for medical reasons? No [No falls in past year] : Patient reported no falls in the past year [0] : 2) Feeling down, depressed, or hopeless: Not at all (0) [Patient reported bone density results were abnormal] : Patient reported bone density results were abnormal [Patient reported colonoscopy was normal] : Patient reported colonoscopy was normal [Never] : Never [PHQ-2 Negative - No further assessment needed] : PHQ-2 Negative - No further assessment needed [de-identified] : occasioanl [Audit-CScore] : 1 [PNR2Ircei] : 0 [BoneDensityDate] : 03/18 [BoneDensityComments] : osteopenia [ColonoscopyDate] : 12/18 [ColonoscopyComments] : as per pt

## 2022-06-28 NOTE — PHYSICAL EXAM
[Well Nourished] : well nourished [Well Developed] : well developed [Well-Appearing] : well-appearing [Normal Sclera/Conjunctiva] : normal sclera/conjunctiva [PERRL] : pupils equal round and reactive to light [EOMI] : extraocular movements intact [Normal Outer Ear/Nose] : the outer ears and nose were normal in appearance [Normal Oropharynx] : the oropharynx was normal [No JVD] : no jugular venous distention [No Lymphadenopathy] : no lymphadenopathy [Supple] : supple [Thyroid Normal, No Nodules] : the thyroid was normal and there were no nodules present [No Respiratory Distress] : no respiratory distress  [No Accessory Muscle Use] : no accessory muscle use [Normal Rate] : normal rate  [Regular Rhythm] : with a regular rhythm [Normal S1, S2] : normal S1 and S2 [No Murmur] : no murmur heard [No Carotid Bruits] : no carotid bruits [No Abdominal Bruit] : a ~M bruit was not heard ~T in the abdomen [No Varicosities] : no varicosities [Pedal Pulses Present] : the pedal pulses are present [No Edema] : there was no peripheral edema [No Palpable Aorta] : no palpable aorta [No Extremity Clubbing/Cyanosis] : no extremity clubbing/cyanosis [Normal Appearance] : normal in appearance [No Nipple Discharge] : no nipple discharge [No Axillary Lymphadenopathy] : no axillary lymphadenopathy [Soft] : abdomen soft [Non Tender] : non-tender [Non-distended] : non-distended [No Masses] : no abdominal mass palpated [No HSM] : no HSM [Normal Bowel Sounds] : normal bowel sounds [Normal Posterior Cervical Nodes] : no posterior cervical lymphadenopathy [Normal Anterior Cervical Nodes] : no anterior cervical lymphadenopathy [No CVA Tenderness] : no CVA  tenderness [No Spinal Tenderness] : no spinal tenderness [Scoliosis] : scoliosis [No Joint Swelling] : no joint swelling [Grossly Normal Strength/Tone] : grossly normal strength/tone [No Rash] : no rash [Coordination Grossly Intact] : coordination grossly intact [No Focal Deficits] : no focal deficits [Normal Gait] : normal gait [Deep Tendon Reflexes (DTR)] : deep tendon reflexes were 2+ and symmetric [Normal Affect] : the affect was normal [Normal Insight/Judgement] : insight and judgment were intact [de-identified] : basilar crackles at left base only with spinal curvature noted

## 2022-06-28 NOTE — PHYSICAL EXAM
[Well Nourished] : well nourished [Well Developed] : well developed [Well-Appearing] : well-appearing [Normal Sclera/Conjunctiva] : normal sclera/conjunctiva [PERRL] : pupils equal round and reactive to light [EOMI] : extraocular movements intact [Normal Outer Ear/Nose] : the outer ears and nose were normal in appearance [Normal Oropharynx] : the oropharynx was normal [No JVD] : no jugular venous distention [No Lymphadenopathy] : no lymphadenopathy [Supple] : supple [Thyroid Normal, No Nodules] : the thyroid was normal and there were no nodules present [No Respiratory Distress] : no respiratory distress  [No Accessory Muscle Use] : no accessory muscle use [Normal Rate] : normal rate  [Regular Rhythm] : with a regular rhythm [Normal S1, S2] : normal S1 and S2 [No Murmur] : no murmur heard [No Carotid Bruits] : no carotid bruits [No Abdominal Bruit] : a ~M bruit was not heard ~T in the abdomen [No Varicosities] : no varicosities [Pedal Pulses Present] : the pedal pulses are present [No Edema] : there was no peripheral edema [No Palpable Aorta] : no palpable aorta [No Extremity Clubbing/Cyanosis] : no extremity clubbing/cyanosis [Normal Appearance] : normal in appearance [No Nipple Discharge] : no nipple discharge [No Axillary Lymphadenopathy] : no axillary lymphadenopathy [Soft] : abdomen soft [Non Tender] : non-tender [Non-distended] : non-distended [No Masses] : no abdominal mass palpated [No HSM] : no HSM [Normal Bowel Sounds] : normal bowel sounds [Normal Posterior Cervical Nodes] : no posterior cervical lymphadenopathy [Normal Anterior Cervical Nodes] : no anterior cervical lymphadenopathy [No CVA Tenderness] : no CVA  tenderness [No Spinal Tenderness] : no spinal tenderness [Scoliosis] : scoliosis [No Joint Swelling] : no joint swelling [Grossly Normal Strength/Tone] : grossly normal strength/tone [No Rash] : no rash [Coordination Grossly Intact] : coordination grossly intact [No Focal Deficits] : no focal deficits [Normal Gait] : normal gait [Deep Tendon Reflexes (DTR)] : deep tendon reflexes were 2+ and symmetric [Normal Affect] : the affect was normal [Normal Insight/Judgement] : insight and judgment were intact [de-identified] : basilar crackles at left base only with spinal curvature noted

## 2022-06-28 NOTE — HEALTH RISK ASSESSMENT
[Yes] : Yes [Monthly or less (1 pt)] : Monthly or less (1 point) [No] : In the past 12 months have you used drugs other than those required for medical reasons? No [No falls in past year] : Patient reported no falls in the past year [0] : 2) Feeling down, depressed, or hopeless: Not at all (0) [Patient reported bone density results were abnormal] : Patient reported bone density results were abnormal [Patient reported colonoscopy was normal] : Patient reported colonoscopy was normal [Never] : Never [PHQ-2 Negative - No further assessment needed] : PHQ-2 Negative - No further assessment needed [de-identified] : occasioanl [Audit-CScore] : 1 [[id number]] : 0 [BoneDensityDate] : 03/18 [BoneDensityComments] : osteopenia [ColonoscopyDate] : 12/18 [ColonoscopyComments] : as per pt

## 2022-06-28 NOTE — PAST MEDICAL HISTORY
[Postmenopausal] : postmenopausal [Menarche Age ____] : age at menarche was [unfilled] [de-identified] : s/p [facility] 2003

## 2022-06-28 NOTE — PAST MEDICAL HISTORY
[Postmenopausal] : postmenopausal [Menarche Age ____] : age at menarche was [unfilled] [de-identified] : s/p [facility] 2003

## 2022-06-28 NOTE — HISTORY OF PRESENT ILLNESS
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..\par Has 2 children in 40's , one with thyroid condition.\par PMH:\par -Scoliosis/Bronchiectasis with chronic lung changes and rales at left base-followed by pulm\par -Hypothyroid-on levothyroxine -normal TSH April 2019-[facility] ([facility]) but will stop going and get labs with me\par -HLD-on diet/statin-cardio f/u Dr Torres-calcium score 5, mild carotis plaque, on aspirin\par -osteopenia-on calcium/Vit D -DEXA 3/23/2018, 8/12/2020\par -Hx hepatic cyst-last f/u with [facility] liver 8/2019-wants rx to repeat study\par -Last colonoscopy Dec 2018-reportedly normal\par -Had Pfizer covid vx\par -Brought update of her vaccines and datesreceived, needs Tdap but will get at pharmacy where it is covered

## 2022-06-28 NOTE — HISTORY OF PRESENT ILLNESS
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..\par Has 2 children in 40's , one with thyroid condition.\par PMH:\par -Scoliosis/Bronchiectasis with chronic lung changes and rales at left base-followed by pulm\par -Hypothyroid-on levothyroxine -normal TSH April 2019-[facility] (endo) but will stop going and get labs with me\par -HLD-on diet/statin-cardio f/u Dr Torres-calcium score 5, mild carotis plaque, on aspirin\par -osteopenia-on calcium/Vit D -DEXA 3/23/2018, 8/12/2020\par -Hx hepatic cyst-last f/u with [facility] liver 8/2019-wants rx to repeat study\par -Last colonoscopy Dec 2018-reportedly normal\par -Had Pfizer covid vx\par -Brought update of her vaccines and datesreceived, needs Tdap but will get at pharmacy where it is covered

## 2022-07-14 ENCOUNTER — APPOINTMENT (OUTPATIENT)
Dept: PULMONOLOGY | Facility: CLINIC | Age: 72
End: 2022-07-14

## 2022-07-14 VITALS
HEIGHT: 59 IN | RESPIRATION RATE: 15 BRPM | HEART RATE: 68 BPM | SYSTOLIC BLOOD PRESSURE: 144 MMHG | BODY MASS INDEX: 23.79 KG/M2 | WEIGHT: 118 LBS | OXYGEN SATURATION: 96 % | TEMPERATURE: 97 F | DIASTOLIC BLOOD PRESSURE: 81 MMHG

## 2022-07-14 DIAGNOSIS — Z01.811 ENCOUNTER FOR PREPROCEDURAL RESPIRATORY EXAMINATION: ICD-10-CM

## 2022-07-14 DIAGNOSIS — Z86.16 PERSONAL HISTORY OF COVID-19: ICD-10-CM

## 2022-07-14 PROCEDURE — 99203 OFFICE O/P NEW LOW 30 MIN: CPT

## 2022-07-14 NOTE — ASSESSMENT
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  Concerned that scoliosis may have affected respiratory function and concerned about diagnosis of bronchiectasis.  \par \par On PE VSS oxygen saturation 96-97% She has kyphosis and scoliosis most pronounced in lumbar area.  Lung exam notable for rales in LLL.  Heart sounds are RRR.  THere is no edema.\par \par Chest CT 1/8/2020 personally reviewed and she does not have bronchiectasis.  There is some scarring in the lingula.  small nodules are noted- 2mm that are too small for me to see on computer that are not significant.  These have been stable.\par \par Impression : No evidence of bronchiectasis and no clinical symptoms to support this diagnosis.  She is not dyspneic.  Scoliosis can affect respiratory function.  There is no evidence of respiratory failure.\par \par Plan:\par 1- Complete PFT to be performed next week.  \par Will advise further after above.

## 2022-07-14 NOTE — HISTORY OF PRESENT ILLNESS
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  with history of scoliosis, bronchiectasis, hypothyroid,hyperlipidemia referred for evalatuion by .  \par \par Personally reviewed CT from 2020 which shows minimal if any bronchiectasis in the LLL, no suspicious nodules.  Scoliosis is noted.  \par \par Has received pneumococcal vaccination in the past recently, not diagnosed.\par Fully covid vaccinated\par Received influenza vaccination this year.\par \par Comes in for evaluation of pulmonary function as she has severe scoliosis that is worsening and was told she may need surgery in the future.  She sees Dr. Sommers at [facility] for special surgery.\par \par Denies shortness of breath, cough, sputum production.  \par Exercises daily, walks up to 4 miles per day.  Practices yoga, breathing exercises.  \par \par History of COVID 19 at the end of JUne, mild symptoms, took Paxlovid.\par

## 2022-07-14 NOTE — PHYSICAL EXAM
[No Acute Distress] : no acute distress [Normal Appearance] : normal appearance [No Neck Mass] : no neck mass [Normal Rate/Rhythm] : normal rate/rhythm [Normal S1, S2] : normal s1, s2 [No Murmurs] : no murmurs [No Resp Distress] : no resp distress [Kyphosis] : kyphosis [Scoliosis] : scoliosis [Normal Gait] : normal gait [No Clubbing] : no clubbing [No Cyanosis] : no cyanosis [No Edema] : no edema [Normal Color/ Pigmentation] : normal color/ pigmentation [No Focal Deficits] : no focal deficits [Oriented x3] : oriented x3 [Normal Affect] : normal affect [TextBox_68] : mostly clear, fine rales in LLL

## 2022-07-21 ENCOUNTER — APPOINTMENT (OUTPATIENT)
Dept: PULMONOLOGY | Facility: CLINIC | Age: 72
End: 2022-07-21

## 2022-07-21 PROCEDURE — 94010 BREATHING CAPACITY TEST: CPT

## 2022-07-21 PROCEDURE — 94729 DIFFUSING CAPACITY: CPT

## 2022-07-21 PROCEDURE — 94726 PLETHYSMOGRAPHY LUNG VOLUMES: CPT

## 2022-07-27 ENCOUNTER — LABORATORY RESULT (OUTPATIENT)
Age: 72
End: 2022-07-27

## 2022-08-01 ENCOUNTER — APPOINTMENT (OUTPATIENT)
Dept: INTERNAL MEDICINE | Facility: CLINIC | Age: 72
End: 2022-08-01

## 2022-08-01 ENCOUNTER — NON-APPOINTMENT (OUTPATIENT)
Age: 72
End: 2022-08-01

## 2022-08-01 LAB
ALBUMIN SERPL ELPH-MCNC: 4.3 G/DL
ALP BLD-CCNC: 57 U/L
ALT SERPL-CCNC: 17 U/L
ANION GAP SERPL CALC-SCNC: 9 MMOL/L
AST SERPL-CCNC: 23 U/L
BASOPHILS # BLD AUTO: 0.03 K/UL
BASOPHILS NFR BLD AUTO: 0.7 %
BILIRUB SERPL-MCNC: 0.4 MG/DL
BUN SERPL-MCNC: 7 MG/DL
CALCIUM SERPL-MCNC: 9.4 MG/DL
CHLORIDE SERPL-SCNC: 105 MMOL/L
CHOLEST SERPL-MCNC: 157 MG/DL
CO2 SERPL-SCNC: 25 MMOL/L
CREAT SERPL-MCNC: 0.81 MG/DL
EGFR: 78 ML/MIN/1.73M2
EOSINOPHIL # BLD AUTO: 0.1 K/UL
EOSINOPHIL NFR BLD AUTO: 2.4 %
ESTIMATED AVERAGE GLUCOSE: 126 MG/DL
GLUCOSE SERPL-MCNC: 90 MG/DL
HBA1C MFR BLD HPLC: 6 %
HCT VFR BLD CALC: 39.8 %
HDLC SERPL-MCNC: 74 MG/DL
HGB BLD-MCNC: 12.5 G/DL
IMM GRANULOCYTES NFR BLD AUTO: 0.2 %
LDLC SERPL CALC-MCNC: 70 MG/DL
LYMPHOCYTES # BLD AUTO: 2.59 K/UL
LYMPHOCYTES NFR BLD AUTO: 62.7 %
MAN DIFF?: NORMAL
MCHC RBC-ENTMCNC: 25.9 PG
MCHC RBC-ENTMCNC: 31.4 GM/DL
MCV RBC AUTO: 82.6 FL
MONOCYTES # BLD AUTO: 0.42 K/UL
MONOCYTES NFR BLD AUTO: 10.2 %
NEUTROPHILS # BLD AUTO: 0.98 K/UL
NEUTROPHILS NFR BLD AUTO: 23.8 %
NONHDLC SERPL-MCNC: 83 MG/DL
PLATELET # BLD AUTO: 208 K/UL
POTASSIUM SERPL-SCNC: 4.4 MMOL/L
PROT SERPL-MCNC: 6.5 G/DL
RBC # BLD: 4.82 M/UL
RBC # FLD: 14.3 %
SODIUM SERPL-SCNC: 139 MMOL/L
TRIGL SERPL-MCNC: 67 MG/DL
WBC # FLD AUTO: 4.13 K/UL

## 2022-08-02 ENCOUNTER — NON-APPOINTMENT (OUTPATIENT)
Age: 72
End: 2022-08-02

## 2022-08-02 RX ORDER — RISEDRONATE SODIUM 150 MG/1
150 TABLET, FILM COATED ORAL
Qty: 3 | Refills: 0 | Status: ACTIVE | COMMUNITY
Start: 2022-07-28

## 2022-08-02 RX ORDER — COVID-19 ANTIGEN TEST
KIT MISCELLANEOUS
Qty: 2 | Refills: 0 | Status: ACTIVE | COMMUNITY
Start: 2022-04-08

## 2023-03-07 ENCOUNTER — RX RENEWAL (OUTPATIENT)
Age: 73
End: 2023-03-07

## 2023-04-12 NOTE — REVIEW OF SYSTEMS
Diagnosis: Breast Cancer  Regimen: Perjeta + Kanjinti  Cycle/Day: C17D1  Is this a C1D1 appt?  No    Jeb Flores MD is supervising clinician today.    ECOG:   ECOG [04/12/23 0902]   ECOG Performance Status 0       Review and verified Advanced Directives: No: Patient declined to create/provide document at this time     Verified if patient has state DNR bracelet on: No; Full Code    Nursing Assessment:   A focused nursing assessment addressing the toxicity of chemotherapy was performed and the patient reports the following:    Anxiety/Depression/Insomnia: Anxiety: No, Depression: No and Insomnia: No  Pain: NO    Toxicity Assessment    Auditory/Ear  Assessment: Yes (Within Defined Limits)    Cardiac General  Assessment: Yes (Within Defined Limits)    Constitutional  Assessment: Yes (w/ Exceptions to WDL)  Fatigue: Grade 1    Dermatology/Skin  Assessment: Yes (Within Defined Limits)    Gastrointestinal  Assessment: Yes (Within Defined Limits)    Infection  Assessment: Yes (Within Defined Limits)    Lymphatics  Assessment: Yes (Within Defined Limits)    Musculoskeletal  Assessment: Yes (Within Defined Limits)    Neurology  Assessment: Yes (w/ Exceptions to WDL)  Paresthesia: Grade 1 (bilateral fingers and toes)    Ocular  Assessment: Yes (Within Defined Limits)    Pain  Assessment: Yes (Within Defined Limits)    Pulmonary/Upper Respiratory  Assessment: Yes (Within Defined Limits)    Genitourinary  Assessment: Yes (Within Defined Limits)        Patient confirms receipt of a signed copy of Anti-Cancer Treatment consent and verbalizes understanding of treatment plan: Yes.     Pre-Treatment: Treatment consent signed  Patient has valid pre-authorization  VS completed  Height and weight verified  BSA independently double checked & verified by two practitioners  Premed orders, including hydration, are verified prior to administration  Treatment parameters verified in patient protocol  Chemotherapy doses independently doubled  checked & verified by two practitioners    Treatment: I have reviewed the following with the patient:  Name of chemo drug, duration and route of infusion, and reportable infusion-related symptoms.  Chemotherapy has not ; double checked & verified by two practitioners  Appearance and physical integrity of drugs meets standard of drug monograph; double checked & verified by two practitioners  Rate set on infusion pump is in alignment with ordered rate; double checked & verified by two practitioners  Blood return confirmed before, during and after treatment administered  Infusion pump used for non-vesicant drugs  Drugs were administered in proper sequencing  Refer to LDA and MAR for line assessment and medication administration  Extravasation/Infiltration: No    Post Treatment: Treatment tolerated well; no adverse reaction    Transfusion: Not needed    Integrative Medicine: No    Oral Chemotherapy: No    Education: No new instructions needed    Next appointment scheduled: YES  Patient instructed to call the office with any questions or concerns.    Patient Discharged: patient discharged to home per self, ambulatory   [Negative] : Heme/Lymph

## 2023-06-20 ENCOUNTER — APPOINTMENT (OUTPATIENT)
Dept: PULMONOLOGY | Facility: CLINIC | Age: 73
End: 2023-06-20
Payer: MEDICARE

## 2023-06-20 VITALS
OXYGEN SATURATION: 98 % | WEIGHT: 122 LBS | SYSTOLIC BLOOD PRESSURE: 142 MMHG | DIASTOLIC BLOOD PRESSURE: 82 MMHG | BODY MASS INDEX: 24.6 KG/M2 | HEIGHT: 59 IN | RESPIRATION RATE: 15 BRPM | TEMPERATURE: 97.8 F | HEART RATE: 87 BPM

## 2023-06-20 VITALS
BODY MASS INDEX: 19.3 KG/M2 | SYSTOLIC BLOOD PRESSURE: 121 MMHG | WEIGHT: 123 LBS | HEIGHT: 67 IN | HEART RATE: 77 BPM | OXYGEN SATURATION: 99 % | DIASTOLIC BLOOD PRESSURE: 75 MMHG

## 2023-06-20 PROCEDURE — 94729 DIFFUSING CAPACITY: CPT

## 2023-06-20 PROCEDURE — ZZZZZ: CPT

## 2023-06-20 PROCEDURE — 94010 BREATHING CAPACITY TEST: CPT

## 2023-06-20 PROCEDURE — 99214 OFFICE O/P EST MOD 30 MIN: CPT | Mod: 25

## 2023-06-20 PROCEDURE — 94726 PLETHYSMOGRAPHY LUNG VOLUMES: CPT

## 2023-06-20 NOTE — PHYSICAL EXAM
[No Acute Distress] : no acute distress [No Deformities] : no deformities [Normal Oropharynx] : normal oropharynx [Erythema] : erythema [Normal Appearance] : normal appearance [Normal Rate/Rhythm] : normal rate/rhythm [Normal S1, S2] : normal s1, s2 [No Resp Distress] : no resp distress [Clear to Auscultation Bilaterally] : clear to auscultation bilaterally [Scoliosis] : scoliosis [Benign] : benign [No Clubbing] : no clubbing [No Edema] : no edema [Normal Color/ Pigmentation] : normal color/ pigmentation

## 2023-06-20 NOTE — HISTORY OF PRESENT ILLNESS
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 Denies any respiratory symptoms. She states she walks about 5-7 miles/ day without any difficulty.\par \par Patient states her scoliosis is worsening, but she is asymptomatic, and will follow with a neurosurgeon. She also reports new diagnosis of osteoporosis for which she will be following up with her physician for treatment options.\par \par Patient also reports COVID infection in July 2022 s/p paxlovid without any persistent symptoms. \par \par OF note, patient with prior CT reading as bronchiectasis. CT reviewed and appears without significant disease.

## 2023-06-20 NOTE — REVIEW OF SYSTEMS
[Fever] : no fever [Chills] : no chills [Cough] : no cough [Sputum] : no sputum [Chest Discomfort] : no chest discomfort [GERD] : no gerd [Diarrhea] : no diarrhea [Arthralgias] : no arthralgias

## 2023-06-20 NOTE — ASSESSMENT
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 She underwent PFTS today where her absolute values are improved from last year. Her percentile meets criteria for restrictive disease, however patient's height is recorded as 5'7'', and she reports her maximal height as 5'2'' prior to her scoliosis worsening, thus likely normal values when corrected for height.\par \par No further testing necessary at this time. Discussed with Dr. Varghese.\par RTC in 1 year or sooner if any issues arise.

## 2023-06-20 NOTE — END OF VISIT
[] : Fellow [FreeTextEntry3] : Total encounter time excludes time spent on .\par  [Time Spent: ___ minutes] : I have spent [unfilled] minutes of time on the encounter.

## 2023-06-27 ENCOUNTER — APPOINTMENT (OUTPATIENT)
Dept: INTERNAL MEDICINE | Facility: CLINIC | Age: 73
End: 2023-06-27
Payer: MEDICARE

## 2023-06-27 ENCOUNTER — NON-APPOINTMENT (OUTPATIENT)
Age: 73
End: 2023-06-27

## 2023-06-27 VITALS
SYSTOLIC BLOOD PRESSURE: 120 MMHG | DIASTOLIC BLOOD PRESSURE: 70 MMHG | HEIGHT: 59 IN | HEART RATE: 68 BPM | BODY MASS INDEX: 24.6 KG/M2 | WEIGHT: 122 LBS | OXYGEN SATURATION: 98 %

## 2023-06-27 DIAGNOSIS — Z00.00 ENCOUNTER FOR GENERAL ADULT MEDICAL EXAMINATION W/OUT ABNORMAL FINDINGS: ICD-10-CM

## 2023-06-27 PROCEDURE — G0444 DEPRESSION SCREEN ANNUAL: CPT

## 2023-06-27 PROCEDURE — G0439: CPT

## 2023-06-27 PROCEDURE — 93000 ELECTROCARDIOGRAM COMPLETE: CPT | Mod: 59

## 2023-06-27 RX ORDER — ROSUVASTATIN CALCIUM 5 MG/1
5 TABLET, FILM COATED ORAL
Qty: 90 | Refills: 3 | Status: ACTIVE | COMMUNITY
Start: 2022-05-11 | End: 1900-01-01

## 2023-06-27 NOTE — COUNSELING
Patient A&Ox4, VSS. Surgical dressing with scant drainage, hemovac removed. Neuro checks at baseline. Pain well controlled with oral medications. Patient ambulating with walker well. Patient tolerating diet well, denies nausea. Patient anticipating discharge home this evening. Will continue to monitor. [Encouraged to increase physical activity] : Encouraged to increase physical activity

## 2023-07-06 ENCOUNTER — APPOINTMENT (OUTPATIENT)
Dept: OPHTHALMOLOGY | Facility: CLINIC | Age: 73
End: 2023-07-06
Payer: MEDICARE

## 2023-07-06 ENCOUNTER — NON-APPOINTMENT (OUTPATIENT)
Age: 73
End: 2023-07-06

## 2023-07-06 PROCEDURE — 92134 CPTRZ OPH DX IMG PST SGM RTA: CPT

## 2023-07-06 PROCEDURE — 92136 OPHTHALMIC BIOMETRY: CPT

## 2023-07-06 PROCEDURE — 92025 CPTRIZED CORNEAL TOPOGRAPHY: CPT

## 2023-07-06 PROCEDURE — 92014 COMPRE OPH EXAM EST PT 1/>: CPT

## 2023-07-14 ENCOUNTER — APPOINTMENT (OUTPATIENT)
Dept: INTERNAL MEDICINE | Facility: CLINIC | Age: 73
End: 2023-07-14
Payer: MEDICARE

## 2023-07-14 VITALS
DIASTOLIC BLOOD PRESSURE: 60 MMHG | OXYGEN SATURATION: 97 % | HEART RATE: 72 BPM | SYSTOLIC BLOOD PRESSURE: 100 MMHG | WEIGHT: 122 LBS | HEIGHT: 59 IN | BODY MASS INDEX: 24.6 KG/M2

## 2023-07-14 DIAGNOSIS — R03.0 ELEVATED BLOOD-PRESSURE READING, W/OUT DIAGNOSIS OF HYPERTENSION: ICD-10-CM

## 2023-07-14 DIAGNOSIS — M85.80 OTHER SPECIFIED DISORDERS OF BONE DENSITY AND STRUCTURE, UNSPECIFIED SITE: ICD-10-CM

## 2023-07-14 DIAGNOSIS — E78.5 HYPERLIPIDEMIA, UNSPECIFIED: ICD-10-CM

## 2023-07-14 DIAGNOSIS — E03.9 HYPOTHYROIDISM, UNSPECIFIED: ICD-10-CM

## 2023-07-14 DIAGNOSIS — H26.9 UNSPECIFIED CATARACT: ICD-10-CM

## 2023-07-14 DIAGNOSIS — Z01.818 ENCOUNTER FOR OTHER PREPROCEDURAL EXAMINATION: ICD-10-CM

## 2023-07-14 PROCEDURE — 99214 OFFICE O/P EST MOD 30 MIN: CPT

## 2023-07-15 ENCOUNTER — TRANSCRIPTION ENCOUNTER (OUTPATIENT)
Age: 73
End: 2023-07-15

## 2023-07-15 LAB
ANION GAP SERPL CALC-SCNC: 10 MMOL/L
BUN SERPL-MCNC: 11 MG/DL
CALCIUM SERPL-MCNC: 9.9 MG/DL
CHLORIDE SERPL-SCNC: 104 MMOL/L
CO2 SERPL-SCNC: 26 MMOL/L
CREAT SERPL-MCNC: 0.79 MG/DL
EGFR: 80 ML/MIN/1.73M2
ESTIMATED AVERAGE GLUCOSE: 126 MG/DL
GLUCOSE SERPL-MCNC: 90 MG/DL
HBA1C MFR BLD HPLC: 6 %
POTASSIUM SERPL-SCNC: 4.8 MMOL/L
SODIUM SERPL-SCNC: 140 MMOL/L

## 2023-07-16 PROBLEM — H26.9 CATARACT: Status: ACTIVE | Noted: 2023-07-16

## 2023-07-16 PROBLEM — R03.0 ELEVATED BP WITHOUT DIAGNOSIS OF HYPERTENSION: Status: ACTIVE | Noted: 2019-12-17

## 2023-07-16 NOTE — PAST MEDICAL HISTORY
[Postmenopausal] : postmenopausal [Menarche Age ____] : age at menarche was [unfilled] [de-identified] : s/p [facility] 2003

## 2023-07-16 NOTE — HISTORY OF PRESENT ILLNESS
[No Pertinent Cardiac History] : no history of aortic stenosis, atrial fibrillation, coronary artery disease, recent myocardial infarction, or implantable device/pacemaker [No Pertinent Pulmonary History] : no history of asthma, COPD, sleep apnea, or smoking [No Adverse Anesthesia Reaction] : no adverse anesthesia reaction in self or family member [(Patient denies any chest pain, claudication, dyspnea on exertion, orthopnea, palpitations or syncope)] : Patient denies any chest pain, claudication, dyspnea on exertion, orthopnea, palpitations or syncope [Moderate (4-6 METs)] : Moderate (4-6 METs) [Chronic Anticoagulation] : no chronic anticoagulation [Chronic Kidney Disease] : no chronic kidney disease [Diabetes] : no diabetes [FreeTextEntry1] : Rt eye cataract surgery [FreeTextEntry2] : 8/1/23 [FreeTextEntry3] : Jim Ruvalcaba FAX) [phone] tel) [phone] [FreeTextEntry4] : LANEY RUIZ  is a 72 year old female  with history of  CAD, carotid atherosclerosis, HLD, hypothyroidism, osteopenia, scoliosis, lattice degeneration of left retina, presbycusis of both ears presented today for preop medical clearance. \par \par Pt is a retired physician. Her main eye symptom is haziness. no eye pain or discharge. \par -SHx: Rt eye cataract surgery 2015, hysterectomy\par She wishes to update allergy reaction to prednisone for rash.\par She will visit [facility] next week to get Prolia injection. \par Denied fever, chills,CP, SOB, abdominal pain, n/v/c/d.

## 2023-07-16 NOTE — PHYSICAL EXAM
[Well Nourished] : well nourished [Well Developed] : well developed [Well-Appearing] : well-appearing [Normal Sclera/Conjunctiva] : normal sclera/conjunctiva [PERRL] : pupils equal round and reactive to light [EOMI] : extraocular movements intact [Normal Outer Ear/Nose] : the outer ears and nose were normal in appearance [Normal Oropharynx] : the oropharynx was normal [No JVD] : no jugular venous distention [No Lymphadenopathy] : no lymphadenopathy [Supple] : supple [Thyroid Normal, No Nodules] : the thyroid was normal and there were no nodules present [No Respiratory Distress] : no respiratory distress  [No Accessory Muscle Use] : no accessory muscle use [Normal Rate] : normal rate  [Regular Rhythm] : with a regular rhythm [Normal S1, S2] : normal S1 and S2 [No Murmur] : no murmur heard [No Carotid Bruits] : no carotid bruits [No Abdominal Bruit] : a ~M bruit was not heard ~T in the abdomen [No Varicosities] : no varicosities [Pedal Pulses Present] : the pedal pulses are present [No Edema] : there was no peripheral edema [No Palpable Aorta] : no palpable aorta [No Extremity Clubbing/Cyanosis] : no extremity clubbing/cyanosis [Normal Appearance] : normal in appearance [No Nipple Discharge] : no nipple discharge [No Axillary Lymphadenopathy] : no axillary lymphadenopathy [Soft] : abdomen soft [Non Tender] : non-tender [Non-distended] : non-distended [No Masses] : no abdominal mass palpated [No HSM] : no HSM [Normal Bowel Sounds] : normal bowel sounds [No CVA Tenderness] : no CVA  tenderness [No Spinal Tenderness] : no spinal tenderness [Scoliosis] : scoliosis [No Joint Swelling] : no joint swelling [Grossly Normal Strength/Tone] : grossly normal strength/tone [No Rash] : no rash [Coordination Grossly Intact] : coordination grossly intact [No Focal Deficits] : no focal deficits [Normal Gait] : normal gait [Deep Tendon Reflexes (DTR)] : deep tendon reflexes were 2+ and symmetric [Normal Affect] : the affect was normal [Normal Insight/Judgement] : insight and judgment were intact [de-identified] : basilar crackles at left base only with spinal curvature noted

## 2023-07-16 NOTE — PHYSICAL EXAM
[de-identified] : WDWN in NAD\par HEENT:  unremarkable\par Neck:  supple, no JVD, no LN\par Lungs:  CTA B/L, no W/R/R\par Heart:  Reg rate, +S1S2, no M/R/G\par Abdomen:  soft, NT, ND, +BS, no masses, no HS-megaly\par Genital: No pubic or genital lesions noted.\par Ext:  no C/C/E\par Neuro:  no focal deficits

## 2023-07-16 NOTE — HEALTH RISK ASSESSMENT
[Yes] : Yes [Monthly or less (1 pt)] : Monthly or less (1 point) [No] : In the past 12 months have you used drugs other than those required for medical reasons? No [No falls in past year] : Patient reported no falls in the past year [0] : 2) Feeling down, depressed, or hopeless: Not at all (0) [PHQ-2 Negative - No further assessment needed] : PHQ-2 Negative - No further assessment needed [Patient reported bone density results were abnormal] : Patient reported bone density results were abnormal [Patient reported colonoscopy was normal] : Patient reported colonoscopy was normal [de-identified] : occasioanl [Audit-CScore] : 1 [[id number]] : 0 [BoneDensityDate] : 03/18 [BoneDensityComments] : osteopenia [ColonoscopyComments] : as per pt [ColonoscopyDate] : 12/18 [Never] : Never

## 2023-07-16 NOTE — RESULTS/DATA
[de-identified] : Reviewed lab of 7/14/23.\par CBC: results reviewed, within normal  range \par Chemistries: results reviewed, BMP within normal range \par A1C: 6.0% preDM\par \par ECG 6/27/23 : results reviewed, Sinus Bradycardia 53bpm, -RSR(V1) -nondiagnostic. -Left atrial enlargement. BORDERLINE.

## 2023-07-16 NOTE — HISTORY OF PRESENT ILLNESS
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..\par Has 2 children in 40's , one with thyroid condition.\par PMH:\par -Scoliosis/Bronchiectasis with chronic lung changes and rales at left base-followed by pulm\par -Hypothyroid-on levothyroxine -normal TSH April 2019-DR [facility] (endo) but stopped going and gest labs with me\par -HLD-on diet/statin-cardio f/u Dr Torres-calcium score 5, mild carotis plaque, on aspirin\par -osteopenia-on calcium/Vit D -DEXA 3/23/2018, 8/12/2020-took Fosomax  x 5 years-stopped -5-6 years\par -Hx hepatic cyst-last f/u with US liver 8/2019-wants rx to repeat study\par -Last colonoscopy Dec 2018-reportedly normal\par -Had Pfizer covid vx\par -Brought update of her vaccines and dates received, needs Tdap but will get at pharmacy where it is covered

## 2023-07-16 NOTE — REVIEW OF SYSTEMS
[FreeTextEntry3] : see hpi [FreeTextEntry2] : Constitutional:  no fever and no chills. \par Eyes:  no discharge. \par HEENT:  no earache. \par Cardiovascular:  no chest pain, no palpitations and no lower extremity edema. \par Respiratory:  no shortness of breath, no wheezing and no cough. \par Gastrointestinal:  no abdominal pain, no nausea and no vomiting. \par Genitourinary:  no dysuria. \par Musculoskeletal:  no joint pain. \par Integumentary:  no itching. \par Neurological:  no headache. \par Psychiatric:  not suicidal. \par Hematologic/Lymphatic:  no easy bleeding.

## 2023-07-16 NOTE — ASSESSMENT
[High Risk Surgery - Intraperitoneal, Intrathoracic or Supringuinal Vascular Procedures] : High Risk Surgery - Intraperitoneal, Intrathoracic or Supringuinal Vascular Procedures - No (0) [Ischemic Heart Disease] : Ischemic Heart Disease - No (0) [Congestive Heart Failure] : Congestive Heart Failure - No (0) [Prior Cerebrovascular Accident or TIA] : Prior Cerebrovascular Accident or TIA - No (0) [Creatinine >= 2mg/dL (1 Point)] : Creatinine >= 2mg/dL - No (0) [Insulin-dependent Diabetic (1 Point)] : Insulin-dependent Diabetic - No (0) [0] : 0 , RCRI Class: I, Risk of Post-Op Cardiac Complications: 3.9%, 95% CI for Risk Estimate: 2.8% - 5.4% [Patient Optimized for Surgery] : Patient optimized for surgery [No Further Testing Recommended] : no further testing recommended [Continue medications as is] : Continue current medications [As per surgery] : as per surgery [FreeTextEntry4] : LANEY RUIZ  is a 72 year old female  with history of  CAD, carotid atherosclerosis, HLD, hypothyroidism, osteopenia, scoliosis, lattice degeneration of left retina, presbycusis of both ears presented today for preop medical clearance. \par \par Pt is low risk candidate for low risk procedure with no further w/up required prior to planned surgery and no contraindication to proceeding with planned surgery. \par \par  [FreeTextEntry7] : Do not take Aspirin, NSAID( Motrin, Ibuprofen etc.), Herb, supplement 7 days prior to surgery. Take Levothyroxine with small sips of water in the morning of surgery.

## 2023-08-01 ENCOUNTER — APPOINTMENT (OUTPATIENT)
Dept: OPHTHALMOLOGY | Facility: AMBULATORY SURGERY CENTER | Age: 73
End: 2023-08-01
Payer: MEDICARE

## 2023-08-01 PROCEDURE — 66984 XCAPSL CTRC RMVL W/O ECP: CPT | Mod: RT

## 2023-08-02 ENCOUNTER — NON-APPOINTMENT (OUTPATIENT)
Age: 73
End: 2023-08-02

## 2023-08-02 ENCOUNTER — APPOINTMENT (OUTPATIENT)
Dept: OPHTHALMOLOGY | Facility: CLINIC | Age: 73
End: 2023-08-02
Payer: MEDICARE

## 2023-08-02 PROCEDURE — 99024 POSTOP FOLLOW-UP VISIT: CPT

## 2023-08-07 ENCOUNTER — TRANSCRIPTION ENCOUNTER (OUTPATIENT)
Age: 73
End: 2023-08-07

## 2023-08-08 ENCOUNTER — NON-APPOINTMENT (OUTPATIENT)
Age: 73
End: 2023-08-08

## 2023-08-08 ENCOUNTER — APPOINTMENT (OUTPATIENT)
Dept: OPHTHALMOLOGY | Facility: CLINIC | Age: 73
End: 2023-08-08
Payer: MEDICARE

## 2023-08-08 PROCEDURE — 99024 POSTOP FOLLOW-UP VISIT: CPT

## 2023-08-22 ENCOUNTER — APPOINTMENT (OUTPATIENT)
Dept: OPHTHALMOLOGY | Facility: CLINIC | Age: 73
End: 2023-08-22
Payer: MEDICARE

## 2023-08-22 ENCOUNTER — NON-APPOINTMENT (OUTPATIENT)
Age: 73
End: 2023-08-22

## 2023-08-22 PROCEDURE — 99024 POSTOP FOLLOW-UP VISIT: CPT

## 2023-08-22 PROCEDURE — 92134 CPTRZ OPH DX IMG PST SGM RTA: CPT

## 2024-04-09 NOTE — PROCEDURE
CC: Hematuria.    HPI: It is a pleasure to see Ms. Bharti Peña, a pleasant 82 year old female, asked to be seen in consultation by Dr. Thompson for evaluation of micro hematuria ( RBCs/HPF). 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    The patient denies any gross hematuria.  Ms. Peña voids without difficulty (Other than nocturia).  She currently denies any dysuria, pyuria, hesitancy, intermittency, feelings of incomplete emptying, or any recent history of urinary tract infections or stones.    Contrast allergy.    Hematuria Risk Factors:  Age >40: Yes     Smoking history: no  Occupational exposure to chemicals or dyes (ie, benzenes, aromatic amines): no  History of urologic disorder or disease: no  History of irritative voiding symptoms: no  History of urinary tract infection: no  Analgesic abuse: no  History of pelvic irradiation: no    Past Medical History:   Diagnosis Date    Heart disease July 2023    Hypertension     Thyroid disease      Past Surgical History:   Procedure Laterality Date    COLONOSCOPY      ORTHOPEDIC SURGERY       Current Outpatient Medications   Medication Sig Dispense Refill    acetaminophen (TYLENOL) 500 MG tablet Take 500-1,000 mg by mouth every 6 hours as needed for mild pain      brimonidine (ALPHAGAN) 0.2 % ophthalmic solution Place 1 drop into both eyes 3 times daily      calcium carbonate (OS-TAMIKO) 1500 (600 Ca) MG tablet Take 600 mg by mouth 2 times daily (with meals)      cholecalciferol (VITAMIN D3) 25 mcg (1000 units) capsule Take 1 capsule by mouth daily Evening      coenzyme Q-10 capsule Take 1 capsule by mouth daily  120 mg      ELIQUIS ANTICOAGULANT 5 MG tablet Take 5 mg by mouth 2 times daily      fenofibrate (TRIGLIDE/LOFIBRA) 160 MG tablet Take 160 mg by mouth daily      fish oil-omega-3 fatty acids 500 MG capsule Take 1 capsule by mouth 2 times daily      latanoprost (XALATAN) 0.005 % ophthalmic solution Place 1 drop into both eyes daily       levothyroxine (SYNTHROID/LEVOTHROID) 50 MCG tablet Take 50 mcg by mouth daily      metoprolol succinate ER (TOPROL XL) 50 MG 24 hr tablet Take 1 tablet (50 mg) by mouth daily 90 tablet 1    multivitamin w/minerals (THERA-VIT-M) tablet Take 1 tablet by mouth daily      netarsudil (RHOPRESSA) 0.02 % ophthalmic solution Place 1 drop into both eyes at bedtime      raloxifene (EVISTA) 60 MG tablet Take 1 tablet (60 mg) by mouth daily 90 tablet 1    red yeast rice 600 MG CAPS Take 600 mg by mouth daily      traZODone (DESYREL) 150 MG tablet Take 2-3 tablets (300-450 mg) by mouth at bedtime 90 tablet 0    vitamin D3 (CHOLECALCIFEROL) 50 mcg (2000 units) tablet Take 1 tablet by mouth 2 times daily      Vitamin E 90 MG (200 UNIT) capsule Take 200 Units by mouth daily       Allergies   Allergen Reactions    Iodinated Contrast Media Hives    Iodine Hives    Latex Hives     FAMILY HISTORY: There is no reported history of genitourinary carcinoma.  There is no history of urolithiasis.      Social History     Socioeconomic History    Marital status:      Spouse name: Not on file    Number of children: Not on file    Years of education: Not on file    Highest education level: Not on file   Occupational History    Not on file   Tobacco Use    Smoking status: Never    Smokeless tobacco: Never   Vaping Use    Vaping Use: Never used   Substance and Sexual Activity    Alcohol use: Not Currently    Drug use: Not Currently    Sexual activity: Not on file   Other Topics Concern    Parent/sibling w/ CABG, MI or angioplasty before 65F 55M? No   Social History Narrative    Not on file     Social Determinants of Health     Financial Resource Strain: Low Risk  (12/21/2023)    Financial Resource Strain     Within the past 12 months, have you or your family members you live with been unable to get utilities (heat, electricity) when it was really needed?: No   Food Insecurity: Low Risk  (12/21/2023)    Food Insecurity     Within the past 12  months, did you worry that your food would run out before you got money to buy more?: No     Within the past 12 months, did the food you bought just not last and you didn t have money to get more?: No   Transportation Needs: Low Risk  (12/21/2023)    Transportation Needs     Within the past 12 months, has lack of transportation kept you from medical appointments, getting your medicines, non-medical meetings or appointments, work, or from getting things that you need?: No   Physical Activity: Not on file   Stress: Not on file   Social Connections: Not on file   Interpersonal Safety: Not on file   Housing Stability: Low Risk  (12/21/2023)    Housing Stability     Do you have housing? : Yes     Are you worried about losing your housing?: No       PHYSICAL EXAM:   There were no vitals filed for this visit.  PSYCH: NAD  EYES: EOMI  MOUTH: MMM  NEURO: AAO x3    Office Visit on 04/08/2024   Component Date Value Ref Range Status    Ventricular Rate 04/08/2024 83  BPM Final    Atrial Rate 04/08/2024 83  BPM Final    LA Interval 04/08/2024 214  ms Final    QRS Duration 04/08/2024 90  ms Final    QT 04/08/2024 358  ms Final    QTc 04/08/2024 420  ms Final    P Axis 04/08/2024 76  degrees Final    R AXIS 04/08/2024 -8  degrees Final    T Axis 04/08/2024 16  degrees Final    Interpretation ECG 04/08/2024    Final                    Value:Sinus rhythm with 1st degree A-V block with occasional Premature ventricular complexes  Otherwise normal ECG  No previous ECGs available  Confirmed by RYAN ROGERS LES LOC:JN ([id number]) on 4/9/2024 12:29:19 PM           ASSESSMENT and PLAN:    82 year old female with high risk micro hematuria.  The differential diagnosis at this point includes stone disease, infection, vaginal contaminant, urothelial malignancy, renal disorder versus another yet unknown diagnosis.    At this time, recommend proceeding with comprehensive hematuria evaluation to include:  - Urine cytology to look for cells concerning  for malignancy.  - CT urogram for upper tract imaging. Premeds ordered for 12 and 2 hours prior. She will also have benadryl on hand to take in addition if needed.   - Cystoscopy with the first available urologist to evaluate the interior of the bladder. Follow up for hematuria as recommended by urologist performing cystoscopic evaluation.    Thank you for allowing me to participate in Ms. Peña's care. I will keep you updated of her progress, but please do not hesitate to contact me with any questions.    Nicki Hill PA-C  [facility]  25 minutes spent on the date of the encounter doing chart review, review of outside records, review of test results, interpretation of tests, patient visit and documentation.    [Other ___] : [unfilled] [Same] : same as the Pre Op Dx. [] : Binocular Microscopy [FreeTextEntry1] : hearing loss [FreeTextEntry4] : none [FreeTextEntry6] : Operative microscope was used to examine the ear canal, ear drum and visible middle ear landmarks. Adequate exam would not have been possible without the use of a microscope. Findings are described.\par \par

## 2024-07-01 ENCOUNTER — APPOINTMENT (OUTPATIENT)
Dept: INTERNAL MEDICINE | Facility: CLINIC | Age: 74
End: 2024-07-01

## 2024-07-02 ENCOUNTER — APPOINTMENT (OUTPATIENT)
Dept: PULMONOLOGY | Facility: CLINIC | Age: 74
End: 2024-07-02
Payer: MEDICARE

## 2024-07-02 VITALS
HEIGHT: 59 IN | DIASTOLIC BLOOD PRESSURE: 82 MMHG | HEART RATE: 66 BPM | OXYGEN SATURATION: 98 % | SYSTOLIC BLOOD PRESSURE: 130 MMHG | BODY MASS INDEX: 24.8 KG/M2 | WEIGHT: 123 LBS

## 2024-07-02 DIAGNOSIS — J98.4 OTHER DISORDERS OF LUNG: ICD-10-CM

## 2024-07-02 DIAGNOSIS — M41.9 SCOLIOSIS, UNSPECIFIED: ICD-10-CM

## 2024-07-02 PROCEDURE — ZZZZZ: CPT

## 2024-07-02 PROCEDURE — 99214 OFFICE O/P EST MOD 30 MIN: CPT | Mod: 25

## 2024-07-02 PROCEDURE — 94010 BREATHING CAPACITY TEST: CPT

## 2024-07-02 PROCEDURE — 94726 PLETHYSMOGRAPHY LUNG VOLUMES: CPT

## 2024-07-02 PROCEDURE — 94729 DIFFUSING CAPACITY: CPT
